# Patient Record
Sex: MALE | Race: WHITE
[De-identification: names, ages, dates, MRNs, and addresses within clinical notes are randomized per-mention and may not be internally consistent; named-entity substitution may affect disease eponyms.]

---

## 2020-09-28 ENCOUNTER — HOSPITAL ENCOUNTER (OUTPATIENT)
Dept: HOSPITAL 92 - BICMAMMO | Age: 35
Discharge: HOME | End: 2020-09-28
Payer: COMMERCIAL

## 2020-09-28 DIAGNOSIS — Z13.820: Primary | ICD-10-CM

## 2020-09-28 PROCEDURE — 77080 DXA BONE DENSITY AXIAL: CPT

## 2020-09-28 NOTE — BD
EXAM: DEXA bone density examination



HISTORY: 35-year-old male for screening with osteoporosis



COMPARISON: None



FINDINGS:

L1--bone mineral density 0.996 g/sq cm; T score -0.7

L2--bone mineral density 0.989 g/sq cm; T score -1.0

L3--bone mineral density 0.993 g/sq cm; T score -1.0

L4--bone mineral density 1.003 g/sq cm; T score -0.8

Total L1-L4--bone mineral density 0.995 g/sq cm; T score -0.9



Left femoral neck--bone mineral density0.879; T score -0.4

Total proximal left femur--bone mineral density 1.010; T score 0.2



IMPRESSION: Normal bone density. 



Reported By: Sergo Holder 

Electronically Signed:  9/28/2020 3:58 PM